# Patient Record
(demographics unavailable — no encounter records)

---

## 2025-07-18 NOTE — ASSESSMENT
[FreeTextEntry1] : patient with POP  to have anothr child next year - if changes job 1- agrees to pessary for now- referred to colleage for placment--doesn't want to wait until dr tiwari returns from maternity leave

## 2025-07-18 NOTE — HISTORY OF PRESENT ILLNESS
[FreeTextEntry1] : 2024: ( Dr Nanette Canas) Prolapse of female pelvic organs (618.9) (N81.9) "29-year-old female who presents for stage II apical prolapse and mild stress urinary incontinence Patient is not bothered by the prolapse and is planning for future childbearing. We discussed the value in pelvic floor physical therapy and strengthening her pelvic floor muscles as well as mitigating her stress urinary incontinence. We discussed that there is no role for prophylactic interventions to prevent future prolapse. If her prolapse were to become symptomatic, we discussed options of pessary versus surgery. Given that she is interested in having additional children, interventions would be geared towards uterine preservation. We also discussed her vaginal discharge and she declined a vaginal swab today. She will continue to monitor this. Advised the patient to follow-up as needed in the future.".   Here for f/u   thus far - 2023-- constipatin after delivery - anal fissure - seen by  and treated wt supp and repaired at end of year -2023- had bulge n vagina felt seen by GYN 2 m ago - no menses for 3 m - + stressed ( - during tax season)- no pregnant- now on OCP  to regulate cycle-- stated prolapse worse now and recommend f/u  referral given for PT  planning for 2nd child next year interested in pessary  no  LUTS or LAKE , no hematuira or utis